# Patient Record
Sex: FEMALE | Race: WHITE | Employment: FULL TIME | URBAN - METROPOLITAN AREA
[De-identification: names, ages, dates, MRNs, and addresses within clinical notes are randomized per-mention and may not be internally consistent; named-entity substitution may affect disease eponyms.]

---

## 2017-03-08 ENCOUNTER — ALLIED HEALTH/NURSE VISIT (OUTPATIENT)
Dept: FAMILY MEDICINE | Facility: CLINIC | Age: 27
End: 2017-03-08
Payer: COMMERCIAL

## 2017-03-08 DIAGNOSIS — H66.001 ACUTE SUPPURATIVE OTITIS MEDIA OF RIGHT EAR WITHOUT SPONTANEOUS RUPTURE OF TYMPANIC MEMBRANE, RECURRENCE NOT SPECIFIED: Primary | ICD-10-CM

## 2017-03-08 DIAGNOSIS — H61.21 IMPACTED CERUMEN OF RIGHT EAR: ICD-10-CM

## 2017-03-08 PROCEDURE — 69209 REMOVE IMPACTED EAR WAX UNI: CPT

## 2017-03-08 PROCEDURE — 99207 ZZC NO CHARGE NURSE ONLY: CPT

## 2017-03-08 RX ORDER — AMOXICILLIN 875 MG
875 TABLET ORAL 2 TIMES DAILY
Qty: 20 TABLET | Refills: 0 | Status: SHIPPED | OUTPATIENT
Start: 2017-03-08 | End: 2018-10-17

## 2017-03-08 NOTE — MR AVS SNAPSHOT
After Visit Summary   3/8/2017    Mai Montgomery    MRN: [id number]           Patient Information     Date Of Birth          1990        Visit Information        Provider Department      3/8/2017 3:00 PM RG FLOAT 1 [facility] [place]        Today's Diagnoses     Acute suppurative otitis media of right ear without spontaneous rupture of tympanic membrane, recurrence not specified    -  1    Impacted cerumen of right ear           Follow-ups after your visit        Who to contact     If you have questions or need follow up information about today's clinic visit or your schedule please contact [facility] directly at [phone].  Normal or non-critical lab and imaging results will be communicated to you by [employer], letter or phone within 4 business days after the clinic has received the results. If you do not hear from us within 7 days, please contact the clinic through [employer] or phone. If you have a critical or abnormal lab result, we will notify you by phone as soon as possible.  Submit refill requests through [employer] or call your pharmacy and they will forward the refill request to us. Please allow 3 business days for your refill to be completed.          Additional Information About Your Visit        MyChart Information     [employer] gives you secure access to your electronic health record. If you see a primary care provider, you can also send messages to your care team and make appointments. If you have questions, please call your primary care clinic.  If you do not have a primary care provider, please call [phone] and they will assist you.        Care EveryWhere ID     This is your Care EveryWhere ID. This could be used by other organizations to access your [place] medical records  [id number]         Blood Pressure from Last 3 Encounters:   10/16/15 114/76   05/20/14 110/62   04/11/14 112/64    Weight from Last 3 Encounters:   10/16/15 138 lb 12.8 oz (63 kg)   05/20/14 130  lb 4.8 oz (59.1 kg)   04/11/14 135 lb 8 oz (61.5 kg)              We Performed the Following     REMOVE IMPACTED CERUMEN          Today's Medication Changes          These changes are accurate as of: 3/8/17  3:31 PM.  If you have any questions, ask your nurse or doctor.               Start taking these medicines.        Dose/Directions    amoxicillin 875 MG tablet   Commonly known as:  AMOXIL   Used for:  Acute suppurative otitis media of right ear without spontaneous rupture of tympanic membrane, recurrence not specified        Dose:  875 mg   Take 1 tablet (875 mg) by mouth 2 times daily   Quantity:  20 tablet   Refills:  0            Where to get your medicines      These medications were sent to [facility]/pharmacy #[id number] - [place] - [address] [address] [facility]  [address] [address] [address]     Phone:  [phone]     amoxicillin 875 MG tablet                Primary Care Provider Office Phone # Fax #    Rafaela Nolasco -[phone]       [facility] [address] [place] [address]        Thank you!     Thank you for choosing [facility]  for your care. Our goal is always to provide you with excellent care. Hearing back from our patients is one way we can continue to improve our services. Please take a few minutes to complete the written survey that you may receive in the mail after your visit with us. Thank you!             Your Updated Medication List - Protect others around you: Learn how to safely use, store and throw away your medicines at www.disposemymeds.org.          This list is accurate as of: 3/8/17  3:31 PM.  Always use your most recent med list.                   Brand Name Dispense Instructions for use    amoxicillin 875 MG tablet    AMOXIL    20 tablet    Take 1 tablet (875 mg) by mouth 2 times daily       balsalazide 750 MG capsule    COLAZAL     Take 3 capsules (2,250 mg) by mouth 3 times daily       * busPIRone 5 MG tablet     BUSPAR    120 tablet    Take 2 tablets (10 mg) by mouth 2 times daily       * busPIRone 10 MG tablet    BUSPAR    180 tablet    Take 1 tablet (10 mg) by mouth 2 times daily       mesalamine 4 G enema    ROWASA     1-2 times daily until symptoms resolve.       norelgestromin-ethinyl estradiol 150-35 MCG/24HR patch    ORTHO EVRA    12 patch    Place 1 patch onto the skin once a week       * Notice:  This list has 2 medication(s) that are the same as other medications prescribed for you. Read the directions carefully, and ask your doctor or other care provider to review them with you.

## 2017-03-08 NOTE — NURSING NOTE
Ear wash completed on right ear without difficulty. Cerumen removed. Had provider examine ear post ear wash due to redness and fluid noted in ear.     Ira Dos Santos, RN, BSN

## 2017-07-29 ENCOUNTER — HEALTH MAINTENANCE LETTER (OUTPATIENT)
Age: 27
End: 2017-07-29

## 2018-03-05 ENCOUNTER — TRANSFERRED RECORDS (OUTPATIENT)
Dept: HEALTH INFORMATION MANAGEMENT | Facility: CLINIC | Age: 28
End: 2018-03-05

## 2018-10-17 ENCOUNTER — OFFICE VISIT (OUTPATIENT)
Dept: FAMILY MEDICINE | Facility: CLINIC | Age: 28
End: 2018-10-17
Payer: COMMERCIAL

## 2018-10-17 VITALS
BODY MASS INDEX: 21.03 KG/M2 | SYSTOLIC BLOOD PRESSURE: 120 MMHG | TEMPERATURE: 98.7 F | HEIGHT: 67 IN | DIASTOLIC BLOOD PRESSURE: 84 MMHG | WEIGHT: 134 LBS | HEART RATE: 105 BPM | OXYGEN SATURATION: 98 %

## 2018-10-17 DIAGNOSIS — R07.0 THROAT PAIN: Primary | ICD-10-CM

## 2018-10-17 LAB
DEPRECATED S PYO AG THROAT QL EIA: NORMAL
HETEROPH AB SER QL: NEGATIVE
SPECIMEN SOURCE: NORMAL

## 2018-10-17 PROCEDURE — 87880 STREP A ASSAY W/OPTIC: CPT | Performed by: NURSE PRACTITIONER

## 2018-10-17 PROCEDURE — 87081 CULTURE SCREEN ONLY: CPT | Performed by: NURSE PRACTITIONER

## 2018-10-17 PROCEDURE — 86308 HETEROPHILE ANTIBODY SCREEN: CPT | Performed by: NURSE PRACTITIONER

## 2018-10-17 PROCEDURE — 36415 COLL VENOUS BLD VENIPUNCTURE: CPT | Performed by: NURSE PRACTITIONER

## 2018-10-17 PROCEDURE — 99213 OFFICE O/P EST LOW 20 MIN: CPT | Performed by: NURSE PRACTITIONER

## 2018-10-17 NOTE — PROGRESS NOTES
"HPI      SUBJECTIVE:   Mai Montgomery is a 28 year old female who presents to clinic today for the following health issues:      Chief Complaint   Patient presents with     Follow Up For     Acute pharyngitis, unspecified etiology (Primary Dx)       Sunday scratchy throat and Monday could hardly swallow   Monday had neg strep without culture   Pain has not improved at all   No fevers   Has aches, chills, sweats. No measured temp   Minimal nasal congestion   Ears plugged   Neck is sore over glands   No cough   Very fatigued       Past Medical History:   Diagnosis Date     ALYSSA (generalised anxiety disorder)     seasonal     Ulcerative colitis (H) age 19     Family History   Problem Relation Age of Onset     Hypertension Mother      Depression Mother      Cancer Paternal Grandfather      Depression Father      Family History Negative No family hx of      Asthma No family hx of      Diabetes No family hx of      Breast Cancer No family hx of      Cancer - colorectal No family hx of      Prostate Cancer No family hx of      Allergies No family hx of      Alzheimer Disease No family hx of      Arthritis No family hx of      Alcohol/Drug No family hx of      Anesthesia Reaction No family hx of      Blood Disease No family hx of      Congenital Anomalies No family hx of      Coronary Artery Disease Mother 45     cva      Past Surgical History:   Procedure Laterality Date     HIP SURGERY      right - osteochondroma     wisdom teeth  age 20     Social History   Substance Use Topics     Smoking status: Former Smoker     Types: Cigarettes     Smokeless tobacco: Never Used     Alcohol use Yes      Comment: rarely     No current outpatient prescriptions on file.     No Known Allergies    Reviewed and updated as needed this visit by clinical staff and provider       ROS  Detailed as above       /84 (BP Location: Right arm, Cuff Size: Adult Regular)  Pulse 105  Temp 98.7  F (37.1  C) (Oral)  Ht 5' 7.21\" (1.707 m)  Wt 134 lb " (60.8 kg)  [facility] 10/03/2018  SpO2 98%  BMI 20.86 kg/m2      Physical Exam   Constitutional: She is well-developed, well-nourished, and in no distress.   HENT:   Head: Normocephalic.   Right Ear: Tympanic membrane, external ear and ear canal normal.   Left Ear: Tympanic membrane, external ear and ear canal normal.   Nose: No mucosal edema.   Mouth/Throat: Posterior oropharyngeal erythema present. No oropharyngeal exudate or posterior oropharyngeal edema.   Eyes: Conjunctivae are normal.   Neck: Normal range of motion.   Cardiovascular: Normal rate, regular rhythm and normal heart sounds.    No murmur heard.  Pulmonary/Chest: Effort normal and breath sounds normal. No respiratory distress.   Musculoskeletal: Normal range of motion.   Lymphadenopathy:     She has no cervical adenopathy (tender without adenopathy).   Neurological: She is alert.   Skin: Skin is warm and dry.   Psychiatric: Mood and affect normal.   Vitals reviewed.      Assessment and Plan:       ICD-10-CM    1. Throat pain R07.0 Strep, Rapid Screen     Mononucleosis screen     Beta strep group A culture       Neg RST  Will screen for mono   If mono neg, then suspect viral etiology  Discussed tyl/ibu prn. Warm salt water gargles for pain   Call or rtc with worsening symptoms or no improvement       DENA Parker, CNP  [facility]

## 2018-10-17 NOTE — MR AVS SNAPSHOT
"              After Visit Summary   10/17/2018    Mai Montgomery    MRN: [id number]           Patient Information     Date Of Birth          1990        Visit Information        Provider Department      10/17/2018 11:00 AM Corinna Boone APRN CNP [facility]        Today's Diagnoses     Throat pain    -  1       Follow-ups after your visit        Who to contact     If you have questions or need follow up information about today's clinic visit or your schedule please contact [facility] directly at [phone].  Normal or non-critical lab and imaging results will be communicated to you by [employer], letter or phone within 4 business days after the clinic has received the results. If you do not hear from us within 7 days, please contact the clinic through [employer] or phone. If you have a critical or abnormal lab result, we will notify you by phone as soon as possible.  Submit refill requests through [employer] or call your pharmacy and they will forward the refill request to us. Please allow 3 business days for your refill to be completed.          Additional Information About Your Visit        MyChart Information     [employer] lets you send messages to your doctor, view your test results, renew your prescriptions, schedule appointments and more. To sign up, go to [email or web address] . Click on \"Log in\" on the left side of the screen, which will take you to the Welcome page. Then click on \"Sign up Now\" on the right side of the page.     You will be asked to enter the access code listed below, as well as some personal information. Please follow the directions to create your username and password.     Your access code is: [email or web address]  Expires: 1/15/2019  1:46 PM     Your access code will  in 90 days. If you need help or a new code, please call your [facility] or [phone].        Care EveryWhere ID     This is your Care EveryWhere ID. This could be used by other organizations to " "access your [place] medical records  [id number]        Your Vitals Were     Pulse Temperature Height Last Period Pulse Oximetry BMI (Body Mass Index)    105 98.7  F (37.1  C) (Oral) 5' 7.21\" (1.707 m) 10/03/2018 98% 20.86 kg/m2       Blood Pressure from Last 3 Encounters:   10/17/18 120/84   10/16/15 114/76   05/20/14 110/62    Weight from Last 3 Encounters:   10/17/18 134 lb (60.8 kg)   10/16/15 138 lb 12.8 oz (63 kg)   05/20/14 130 lb 4.8 oz (59.1 kg)              We Performed the Following     Beta strep group A culture     Mononucleosis screen     Strep, Rapid Screen        Primary Care Provider Office Phone # Fax #    Rafaela Nolasco -[phone] [address] [facility] [address]        Equal Access to Services     [facility] : Hadii aad ku hadasho Soomaali, waaxda luqadaha, qaybta kaalmada adeegyada, waxay renaein hayeleuterion vishal [facility] . So [facility] [phone].    ATENCIÓN: Si habla karla, tiene a fish disposición servicios gratuitos de asistencia lingüística. Llame al [phone].    We comply with applicable federal civil rights laws and [place] laws. We do not discriminate on the basis of race, color, national origin, age, disability, sex, sexual orientation, or gender identity.            Thank you!     Thank you for choosing [facility]  for your care. Our goal is always to provide you with excellent care. Hearing back from our patients is one way we can continue to improve our services. Please take a few minutes to complete the written survey that you may receive in the mail after your visit with us. Thank you!             Your Updated Medication List - Protect others around you: Learn how to safely use, store and throw away your medicines at www.disposemymeds.org.      Notice  As of 10/17/2018  1:46 PM    You have not been prescribed any medications.      "

## 2018-10-18 LAB
BACTERIA SPEC CULT: NORMAL
SPECIMEN SOURCE: NORMAL

## 2019-05-06 ENCOUNTER — TELEPHONE (OUTPATIENT)
Dept: FAMILY MEDICINE | Facility: CLINIC | Age: 29
End: 2019-05-06

## 2019-05-06 NOTE — TELEPHONE ENCOUNTER
Summary:    Patient is due/failing the following:   PAP and PHYSICAL    Action needed:   Patient needs office visit for Physical and PAP.    Type of outreach:    Phone, left message for patient to call back.     Questions for provider review:    None                                                                                                                                    Shanika Meng       Chart routed to Care Team .      Panel Management Review      Patient has the following on her problem list: None      Composite cancer screening  Chart review shows that this patient is due/due soon for the following Pap Smear

## 2019-05-06 NOTE — LETTER
[facility]  [address], [address]  [place] [address]  Phone: [phone]  Fax: [phone]  May 29, 2019      Mai Montgomery  [address] [address] [address]      Dear Mai,    We care about your health and have reviewed your health plan including your medical conditions, medications, and lab results.  Based on this review, it is recommended that you follow up regarding the following health topic(s):  -Cervical Cancer Screening  -Wellness (Physical) Visit     We recommend you take the following action(s):  -schedule a PAP SMEAR EXAM which is due.  Please disregard this reminder if you have had this exam elsewhere within the last year.  It would be helpful for us to have a copy of your recent pap smear report to update your records.     Please call us at the [facility] - [phone] (or use [employer]) to address the above recommendations.     Thank you for trusting [facility] and we appreciate the opportunity to serve you.  We look forward to supporting your healthcare needs in the future.    Healthy Regards,    Your Health Care Team  [facility]

## 2020-03-25 ENCOUNTER — NURSE TRIAGE (OUTPATIENT)
Dept: NURSING | Facility: CLINIC | Age: 30
End: 2020-03-25

## 2020-03-25 ENCOUNTER — VIRTUAL VISIT (OUTPATIENT)
Dept: FAMILY MEDICINE | Facility: OTHER | Age: 30
End: 2020-03-25

## 2020-03-25 NOTE — TELEPHONE ENCOUNTER
"Patient calling- reports she has history of Ulcerative colitis- is having issues with a \"flare up\". Declines triage. Wants to to set up an appointment tomorrow with provider. Advised that clinic are doing telephone visits. Warm transferred to scheduling.     Soha Flores, RN/[facility]    Reason for Disposition    Requesting regular office appointment    Additional Information    Negative: [1] Caller is not with the adult (patient) AND [2] reporting urgent symptoms    Negative: Lab result questions    Negative: Medication questions    Negative: Caller can't be reached by phone    Negative: Caller has already spoken to PCP or another triager    Negative: RN needs further essential information from caller in order to complete triage    Protocols used: INFORMATION ONLY CALL-A-AH      "

## 2020-03-26 NOTE — PROGRESS NOTES
"Date: 2020 18:28:28  Clinician: Juan Alberto Sims  Clinician NPI: [id number]  Patient: Mai Chacon  Patient : 1990  Patient Address: [address] [place] [address]  Patient Phone: [phone]  Visit Protocol: IBS  Patient Summary:  Mai is a 30 year old ( : 1990 ) female who initiated a Visit for evaluation of IBS. When asked the question \"Please sign me up to receive news, health information and promotions from [employer].\", Mai responded \"No\".    In the last 3 months, she notes experiencing abdominal pain or discomfort more than one day a week. She has been experiencing discomfort or pain for less than 6 months. With regard to the abdominal pain, the patient notes:     The pain sometimes got better or stopped completely after a bowl movement    Always having more frequent bowel movements after the pain started    Never having less frequent bowel movements after the pain started    Always having looser or less solid stools when the pain started    Never having harder stools when the pain started     In the last 3 months, she experienced the following:     Hard or lumpy stools: never     Loose, mushy or watery stools: about 50% of the time     Blood in the stool: never     Black stools: never     Vomited blood: never     Sharp abdominal pain     The patient denies the following: abdominal pain that interferes with sleep, diarrhea that interferes with sleep, pain with urination, feeling feverish, recent abdominal trauma or injury, increased urination frequency, reflux, heartburn, unintentional weight loss, and back pain associated with stomach symptoms.   The patient has NOT modified her diet to treat these symptoms. She has also attempted the following treatments:    OTC anti-diarrhea medication (improved symptoms)    She has been previously diagnosed with Ulcerative colitis.   The patient does not smoke or use smokeless tobacco.   She denies pregnancy and denies breastfeeding. She " has menstruated in the past month.     MEDICATIONS: No current medications, ALLERGIES: NKDA  Clinician Response:  Dear Mai,  Based on the information you provided, you likely have Irritable Bowel Syndrome, a condition that leads to abdominal pain and cramping.  Unless you are allergic to the over-the-counter medication(s) below, I recommend using:       Methylcellulose (Citrucel). Take 1 scoop orally 1-3 times a day mixed with water. This is an over-the-counter medication that does not require a prescription.      Loperamide (Imodium). Take 4 mg by mouth once, then 2 mg by mouth after each loose stool until symptoms are controlled. Maximum 4 tablets per day. This is an over-the-counter medication that does not require a prescription.     Irritable Bowel Syndrome (IBS) is a common condition described as a combination of persistent and recurrent abdominal pain that is associated with abnormal bowel habits including diarrhea, constipation or both. Many individuals experience pain after eating and the pain is typically relieved after a bowel movement. Other symptoms of irritable bowel syndrome include bloating, gas, and burping or belching. The exact cause of IBS is not known.  The goal of treatment is to reduce your symptoms to a manageable level. Treatment of IBS is typically a long-term process and you may need to try several different treatments or may need to combine treatments to reduce your symptoms.     Monitor your symptoms to try determine if certain things make your symptoms worse, such as consuming dairy products.    Dietary management may include eliminating certain foods from your diet. However, consult with a health care professional before completely eliminating foods from your diet.    Increasing dietary fiber or using fiber supplements can reduce IBS symptoms.    Medications: prescription medications are sometimes recommended. However, these medications are not typically used until a person has tried  dietary changes or fiber supplements.    Over the counter medications for treatment of diarrhea should not be continued on an ongoing basis unless you have consulted with a health care professional.     For more information about IBS and treatment, see this [employer] online resource: http://www.nlm.nih.gov/medlineplus/irritablebowelsyndrome.html.   If at any time your symptoms worsen while trying these therapies, follow up with your primary care provider.   If you do not experience any improvement within two to four weeks, you should be seen by your primary care provider.   If you develop worsening abdominal pain with nausea, vomiting, or blood in stool, be seen by your primary care provider, urgent care clinic, or emergency department.   Diagnosis: Irritable bowel syndrome with diarrhea  Diagnosis ICD: K58.0

## 2020-03-27 ENCOUNTER — VIRTUAL VISIT (OUTPATIENT)
Dept: FAMILY MEDICINE | Facility: CLINIC | Age: 30
End: 2020-03-27
Payer: COMMERCIAL

## 2020-03-27 DIAGNOSIS — K51.30 ULCERATIVE RECTOSIGMOIDITIS WITHOUT COMPLICATION (H): Primary | ICD-10-CM

## 2020-03-27 DIAGNOSIS — Z30.02 ENCOUNTER FOR COUNSELING AND INSTRUCTION IN NATURAL FAMILY PLANNING TO AVOID PREGNANCY: ICD-10-CM

## 2020-03-27 PROCEDURE — 99214 OFFICE O/P EST MOD 30 MIN: CPT | Mod: TEL | Performed by: INTERNAL MEDICINE

## 2020-03-27 RX ORDER — MESALAMINE 500 MG/1
1000 CAPSULE, EXTENDED RELEASE ORAL 4 TIMES DAILY
Qty: 240 CAPSULE | Refills: 3 | Status: SHIPPED | OUTPATIENT
Start: 2020-03-27 | End: 2023-04-19

## 2020-03-27 RX ORDER — PREDNISONE 10 MG/1
TABLET ORAL
Qty: 70 TABLET | Refills: 0 | Status: SHIPPED | OUTPATIENT
Start: 2020-03-27 | End: 2023-04-19

## 2020-03-27 NOTE — PATIENT INSTRUCTIONS
Patient Education     Birth Control Choices  Birth control keeps you from getting pregnant during sex. There are many types of birth control. Some are more effective than others. New types are being tested all the time. Your healthcare provider can help you decide which type of birth control is best for you. But no matter which type you choose, you and your partner must use it the right way each time you have sex. Some of the most common types are described below.  Condom  A condom is a thin covering that fits over the penis. (The female condom fits inside the vagina.) A condom catches sperm that come out of the penis during sex.  Spermicide  Spermicide is a gel, foam, cream, tablet, or sponge (although the sponge has barrier properties in addition to spermicidal properties). It is put in the vagina before sex to kill sperm.  Diaphragm and cervical cap  Diaphragms and cervical caps are round rubber cups that keep sperm out of the uterus. They also hold spermicide in place.  Intrauterine device (IUD)  An IUD is a small device that is placed in the uterus by a healthcare provider to prevent pregnancy.  The pill  The birth control pill is taken daily. It contains hormones that stop a woman s body from releasing an egg each month.  Other hormones  Hormones that stop a woman s egg from being released each month can be delivered in other ways. These include injection, implant, patch, or vaginal ring.  Other choices  Here are additional birth control methods:    Male sterilization (vasectomy) is surgery that ties off or cuts the tubes called the vas deferens in the testes. This is done so sperm cannot come out when the man ejaculates.    Female sterilization is surgery to block or cut the woman's fallopian tubes. It can be done by placing an instrument into the uterus (hysteroscopy) to insert small coils into the fallopian tubes (Essure). It can also be done through the belly (laparoscopy) to block the tubes or  remove part or all of the tubes.    Withdrawal method is when the male doesn't ejaculate into the vagina, but rather withdraws his penis just before he ejaculates.    Fertility awareness method is when a woman keeps track of her fertile days. She only has intercourse at times when she is not likely to get pregnant.  Emergency contraception (EC)  Emergency contraception can help prevent pregnancy after unprotected sex. Hormone pills ( morning after pills ) are available over the counter to anyone. A second type of EC, a copper IUD, needs to be inserted by a trained healthcare provider. Either type of EC can be used up to 5 days after sex, but it should be taken as soon as possible. The sooner it is used after unprotected sex, the more likely it is to be effective. EC will not work if you re already pregnant.  Things to consider  Think about the following:    Choose a type of birth control that is easy for you to use.    Read the package and follow your health care provider's instructions to learn to use your birth control the right way.    Most forms of birth control do not protect you from sexually transmitted infections (STIs). To protect against STIs, always use a latex condom. If you are allergic to latex, a nonlatex condom may provide some protection.   Date Last Reviewed: 12/1/2016 2000-2019 [employer]. [address] [place] [address], [place] [address]. All rights reserved. This information is not intended as a substitute for professional medical care. Always follow your healthcare professional's instructions.           Patient Education     Birth Control: Natural Family Planning     To use NFP, you keep daily records of the signs that show when you are fertile.     Natural Family Planning (NFP) is based on a woman's awareness of when she is fertile (likely to become pregnant). By learning how to tell when you're fertile, you can know when to avoid sex. This can help prevent pregnancy. To learn NFP,  it is advised that you take a class or work with a qualified teacher.  Pregnancy rates  Talk to your healthcare provider about the effectiveness of this birth control method.  Using NFP  A woman is fertile only during a certain part of her monthly cycle -- just before and during ovulation. By learning when you ovulate, you can know when you're likely to be fertile. You estimate when you're ovulating by observing and keeping track of certain physical signs. You can then avoid sex or use a barrier method during that fertile time. But be aware that each woman's cycle and signs are different, and no woman's cycle is perfectly regular.  Pros    Both partners share responsibility    No known health risks    No side effects    Is inexpensive or free    If you abstain from sex during fertile periods, this method is approved by all [employer] communities    Easy to stop if you decide you want to become pregnant  Cons    Takes time to learn    Requires daily observation and charting    Requires abstaining from sex or using a barrier method during fertile periods (nine or more days per month)    Does not protect against sexually transmitted infections (STIs)  When natural family planning may not be for you  Natural family planning may not be for you if:    You don't have the full cooperation of your partner    You haven't received training from a qualified teacher    Your periods are not regular    You take certain medicines or should not get pregnant due to a medical condition    You just started having periods or you are approaching menopause  Date Last Reviewed: 3/1/2017    1099-5899 [employer]. [address] [place] [address]. All rights reserved. This information is not intended as a substitute for professional medical care. Always follow your healthcare professional's instructions.           Patient Education     Managing Ulcerative Colitis: Medicines  Your healthcare provider may prescribe  medicine to help control your ulcerative colitis. Medicine can help lessen symptoms. It won t cure ulcerative colitis, but it can help improve your quality of life. Work closely with your healthcare provider. You may have certain side effects or your symptoms may change. In this case, your medicine or dosage may need to be changed.  Types of medicines  You may be prescribed any of these types of medicines:    Anti-inflammatories    Corticosteroids    Immunomodulators    Biologic agents    Antibiotics    Probiotics  You can learn more about each kind below.  Anti-inflammatories  These medicines can reduce inflammation and pain in the intestinal lining. The most common anti-inflammatories for ulcerative colitis are 5-ASA compounds, such as sulfasalazine and mesalamine. These help control symptoms over long periods of time. They may be taken as pills, but they also can be taken as an enema or suppository inserted directly into the rectum.  Corticosteroids  Your healthcare provider may advise you to take corticosteroids. These help to calm inflammation in your body. This can make your symptoms better quickly. You may take corticosteroids as a pill or liquid by mouth. In some cases, they may be given through an IV. Or they may be given rectally as either a suppository or an enema. You take them for a short time, usually not longer than 8 to 12 weeks. You do not take them when you are in remission. Remission is a long period with no symptoms.  If used for a long time, side effects may include:    Mood changes    Trouble sleeping    Changes in body shape    Puffy face or acne    Weight gain    Stretch marks    Eye problems    Bone loss or breaks    Facial hair in women    High blood pressure    Risk of diabetes  Immunomodulators  These medicines cause your body's immune system to be less active. This can help reduce inflammation and calm your symptoms. They are taken as a pill by mouth. You may not feel their effects until  you have taken them for a few months. But you can take them for a long time. You will need to have blood tests every few months to check your liver and blood cell counts.   Side effects may include:    Nausea    Body aches    Inflammation of the pancreas    Low white blood cell count    Liver problems    Low folic acid levels    Infection    Lymphoma    Non-melanoma skin cancer  Biologic agents  These kinds of medicines help stop body chemicals that cause inflammation. One medicine, infliximab, is an antibody that blocks a chemical called tumor necrosis factor (TNF), which plays a significant role in intestinal inflammation.  Another medicine, vedolizumab, is a different antibody that blocks white blood cells from getting into the intestinal tissue and causing inflammation. New biologics are also being created that target different ways the intestine gets inflamed in ulcerative colitis. These medicines may be given different ways. They may be given by vein (IV) every 2 to 8 weeks. They may be given with a shot (injection) once a week or once a month. These medicines can put you at risk for infections. Tell your healthcare provider if you have a chronic infection. You will need to be tested for tuberculosis and hepatitis B infection before taking the medicine.  Side effects may include:    Flushing, check pain, shortness of breath, hives, or a drop in blood pressure during IV treatment    Joint and muscle aches    Rash    Fever    Infection    Lymphoma    Skin cancers    Liver toxicity    TNF-induced psoriasis  Antibiotics  These may be used if you also have an infection, such as an abscess. Antibiotics may be given as a pill taken by mouth, or sometimes into a vein (IV) for more serious infections. Depending on the antibiotic, you may need to stay out of the sun, or completely avoid alcohol. Although antibiotics may be of great benefit, they also may cause severe reactions. These can include nausea, vomiting, and  breathing problems. Tell your healthcare provider right away if you have numbness or tingling in your hands. Also tell your healthcare provider if your bowel symptoms become worse.  Side effects may include:    Nausea    Diarrhea    Headaches    Dizziness    Dark urine    Loss of appetite    Metallic taste in the mouth    Sensitivity to the sun  Probiotics  Probiotics are living organisms beneficial for health that you can take in a supplement. They may help some cases of ulcerative colitis, but research studies are not clear about if there is a benefit. Talk with your healthcare provider to learn more.  Managing side effects  Your healthcare provider will explain the side effects of any new medicines. In most cases, side effects are easy to manage. But sometimes they can be so severe that you need to change medicine. Call your healthcare provider if you have any of the below:    Side effects that are hard to manage    Severe side effects    Unexpected side effects  Date Last Reviewed: 11/1/2016 2000-2019 [employer]. [address] [place] [address]. All rights reserved. This information is not intended as a substitute for professional medical care. Always follow your healthcare professional's instructions.           Patient Education     Management of Ulcerative Colitis: Lifestyle    You can lead a full life even if you have ulcerative colitis. Focus on keeping your symptoms under control. And don t let this disease isolate you. By planning ahead and working with support groups, you can find ways to cope. And you may even help others who have ulcerative colitis.  Ulcerative colitis is a type of inflammatory bowel disease (IBD).   Have a plan  Make this your goal:  Ulcerative colitis won t keep me from the activities I enjoy.  You may need to do some planning to reach that goal. But by staying positive, you can help make sure you re in control--not ulcerative colitis. Here are some other  tips:    Know where to find clean bathrooms.    Eat more small meals instead of 3 big meals, especially when on the road or when you don t have easy access to bathrooms.    If you ve had a recent flare-up, eat foods that you know will limit your symptoms. Keep those foods on hand, both at home and at work.    Get some exercise every day.    Take a stress reduction class.    If going on a long trip, discuss your plans with your healthcare provider. He or she can teach you what to do if you have a flare-up while on the road.  Find a support group  Ulcerative colitis support groups can help you with many concerns you may have. Other people have felt much of what you may be feeling. Just knowing that you re not alone can be a great comfort. Or someone in a support group may offer a travel tip or a coping skill that s perfect for you. And don t forget how satisfying it can feel to help another ulcerative colitis patient who s in need. Contact the [facility] toll-free at [phone].  Managing nutrition  You may be able to eat most foods until you have a flare-up. But like anyone else, you need to make healthy eating choices. Some of the healthiest foods can make symptoms worse, though. Keeping track of your  problem foods  may be helpful. Ask your healthcare provider any questions you have about healthy eating.  Avoid your problem foods  There s no rule for which foods can be a problem. How you feel after eating them is the best guide. You may need to avoid high-fiber foods and foods that are hard to digest. These can include fresh fruits and vegetables. High-fat foods, such as whole-milk dairy products and red meat, can also worsen symptoms in a flare-up. Write down what you eat and how it affects you. If one kind of food often gives you trouble, stay away from it. Also note the foods that work well for you. Your healthcare provider may have you see a nutritionist to come up with the best food  choices for you. A nutritionist can help ensure that you eat foods that are  safe  while getting proper nourishment.  Foods that are often  safe   No two people respond the same to all foods. But these choices are often  safe  to eat during a flare-up:    Applesauce    July toast    Flavored gelatin    Vanilla pudding    Custard    White rice    Plain pasta   Canned peaches or pears    Baked potatoes     Tuna packed in water    Mashed potatoes    Skinless chicken    Instant oatmeal   Date Last Reviewed: 10/1/2016    3811-1317 [employer]. [address] [place] [address]. All rights reserved. This information is not intended as a substitute for professional medical care. Always follow your healthcare professional's instructions.

## 2020-03-27 NOTE — PROGRESS NOTES
"Mai Montgomery is a 30 year old female who is being evaluated via a telephone visit.      The patient has been notified of following (by BERNARDO BESS MA      \"We have found that certain health care needs can be provided without the need for a physical exam.  This service lets us provide the care you need with a short phone conversation.  If a prescription is necessary we can send it directly to your pharmacy.  If lab work is needed we can place an order for that and you can then stop by our lab to have the test done at a later time.    This telephone visit will be conducted via 3 way call with the you (the patient) , the physician/provider, and a me all on the line at the same time.  This allows your physician/provider to have the phone conversation with you while I will be taking notes for your medical record.  We will have full access to your [facility] medical record during this entire phone call.    Since this is like an office visit,  will bill your insurance company for this service.  Please check with your medical insurance if this type of telephone/virtual is covered . You may be responsible for the cost of this service if insurance coverage is denied.  The typical cost is $30 (10min), $59(11-20min) and $85 (21-30min)     If during the course of the call the physician/provider feels a telephone visit is not appropriate, you will not be charged for this service\"    Consent has been obtained for this service by care team member: yes.  See the scanned image in the medical record.    S:   Mai Montgomery is a 30 year old female who is being evaluated via a billable telephone visit.      The patient has been notified of following:     \"This telephone visit will be conducted via a call between you and your physician/provider. We have found that certain health care needs can be provided without the need for a physical exam.  This service lets us provide the care you need with a short phone conversation.  If a prescription is " "necessary we can send it directly to your pharmacy.  If lab work is needed we can place an order for that and you can then stop by our lab to have the test done at a later time.    If during the course of the call the physician/provider feels a telephone visit is not appropriate, you will not be charged for this service.\"     Mai Montgomery complains of   Chief Complaint   Patient presents with     Abdominal Pain     Had virtual visit 3-25-20 thru [facility]      Patient is new to me.  Her mother has asked me to see her virtually.  About 10 years ago patient had blood in the stool and mucus, and she was found to have ulcerative colitis by colonoscopy evaluation  She was given steroids and Pentasa but she has stopped taking those medications  Steroids worked very well for her for her flareup  Now for 1 month, patient has mucus and diarrhea up to 10 times per day with abdominal discomfort  She also has nighttime diarrhea  No weight loss or fever    She is  since last year  She is not using contraception  She does not see a gastroenterologist at present  I have reviewed and updated the patient's Past Medical History, Social History, Family History and Medication List.    ALLERGIES  Patient has no known allergies.    Past medical history  Past Medical History:   Diagnosis Date     ALYSSA (generalised anxiety disorder)     seasonal     Ulcerative colitis (H) age 19     Past Surgical History:   Procedure Laterality Date     HIP SURGERY  2002    right - osteochondroma     wisdom teeth  age 20     Family History   Problem Relation Age of Onset     Hypertension Mother      Depression Mother      Coronary Artery Disease Mother 45        cva      Atrial fibrillation Mother      Cancer Paternal Grandfather      Depression Father      GI problems Brother      Family History Negative No family hx of      Asthma No family hx of      Diabetes No family hx of      Breast Cancer No family hx of      Cancer - colorectal No family hx of  "     Prostate Cancer No family hx of      Allergies No family hx of      Alzheimer Disease No family hx of      Arthritis No family hx of      Alcohol/Drug No family hx of      Anesthesia Reaction No family hx of      Blood Disease No family hx of      Congenital Anomalies No family hx of          Assessment/Plan:  1. Ulcerative rectosigmoiditis without complication (H)  I discussed with patient why Pentasa is needed for continuation of the remission and cancer prevention  I discussed the GI side effects  She prefers to take prednisone alone and I do not think that is a good idea in addition I think she should see a gastroenterologist    She wishes to find an internist with gastroenterology interest, but I think it will be preferable to see gastroenterologist with inflammatory bowel disease training  And interest    I discussed with her about nicotine patches  - GASTROENTEROLOGY ADULT REF CONSULT ONLY  - GASTROENTEROLOGY ADULT REF CONSULT ONLY  - mesalamine ER (PENTASA) 500 MG CR capsule; Take 2 capsules (1,000 mg) by mouth 4 times daily  Dispense: 240 capsule; Refill: 3  - predniSONE (DELTASONE) 10 MG tablet; 40 MG DAILY FOR A WEEK, THEN 30 MG DAILY FOR 1 WEEK, THEN 20 MG DAILY FOR 1 WEEK, THEN 10 MG DAILY FOR 1 WEEK, THEN 5 MG DAILY  Dispense: 70 tablet; Refill: 0    2. Encounter for counseling and instruction in natural family planning to avoid pregnancy  These medications can affect the pregnancy and therefore I discussed with her about contraceptive methods  She is interested in only natural methods and I discussed those with her  However she could use a condom and she is agreeable to that    aCrmela Kaufman      Phone call duration:  26 minutes    CARMELA KAUFMAN M.D., F.A.C.P

## 2021-08-17 ENCOUNTER — TRANSFERRED RECORDS (OUTPATIENT)
Dept: HEALTH INFORMATION MANAGEMENT | Facility: CLINIC | Age: 31
End: 2021-08-17

## 2021-08-17 LAB
CREATININE (EXTERNAL): 0.58 MG/DL (ref 0.57–1)
GFR ESTIMATED (EXTERNAL): 122 ML/MIN/1.73
GFR ESTIMATED (IF AFRICAN AMERICAN) (EXTERNAL): 141 ML/MIN/1.73

## 2022-05-26 ENCOUNTER — TRANSFERRED RECORDS (OUTPATIENT)
Dept: HEALTH INFORMATION MANAGEMENT | Facility: CLINIC | Age: 32
End: 2022-05-26
Payer: COMMERCIAL

## 2022-06-08 ENCOUNTER — TRANSFERRED RECORDS (OUTPATIENT)
Dept: HEALTH INFORMATION MANAGEMENT | Facility: CLINIC | Age: 32
End: 2022-06-08
Payer: COMMERCIAL

## 2023-04-18 ENCOUNTER — TELEPHONE (OUTPATIENT)
Dept: FAMILY MEDICINE | Facility: CLINIC | Age: 33
End: 2023-04-18
Payer: COMMERCIAL

## 2023-04-18 NOTE — TELEPHONE ENCOUNTER
New Medication Request    Contacts       Type Contact Phone/Fax    04/18/2023 11:58 AM CDT Phone (Incoming) Mai Montgomery (Self) [phone] (H)          What medication are you requesting?: muscle relaxer    Reason for medication request: Back pain    Have you taken this medication before?: No    Controlled Substance Agreement on file:   CSA -- Patient Level:    CSA: None found at the patient level.         Patient offered an appointment? No    Preferred Pharmacy:   [facility]/pharmacy #[id number] - [facility] [address], [address], [facility] [address]  Phone: [phone] Fax: [phone]      Okay to leave a detailed message?: Yes at Cell number on file:    Telephone Information:   Mobile [phone]

## 2023-04-18 NOTE — TELEPHONE ENCOUNTER
I do not see a muscle relaxer on the med list and pt has not been a [facility] pt since 2014. Hasn't been seen by [facility] since 2020    Visit needed

## 2023-04-19 ENCOUNTER — OFFICE VISIT (OUTPATIENT)
Dept: FAMILY MEDICINE | Facility: CLINIC | Age: 33
End: 2023-04-19
Payer: COMMERCIAL

## 2023-04-19 VITALS
OXYGEN SATURATION: 97 % | DIASTOLIC BLOOD PRESSURE: 85 MMHG | HEART RATE: 109 BPM | SYSTOLIC BLOOD PRESSURE: 122 MMHG | WEIGHT: 148.9 LBS | RESPIRATION RATE: 17 BRPM | HEIGHT: 68 IN | TEMPERATURE: 98.2 F | BODY MASS INDEX: 22.57 KG/M2

## 2023-04-19 DIAGNOSIS — M46.1 SACROILIITIS (H): Primary | ICD-10-CM

## 2023-04-19 PROCEDURE — 99213 OFFICE O/P EST LOW 20 MIN: CPT | Performed by: FAMILY MEDICINE

## 2023-04-19 RX ORDER — ERGOCALCIFEROL 1.25 MG/1
50000 CAPSULE, LIQUID FILLED ORAL WEEKLY
COMMUNITY
Start: 2022-06-09

## 2023-04-19 RX ORDER — METHYLPREDNISOLONE 4 MG
TABLET, DOSE PACK ORAL
Qty: 21 TABLET | Refills: 0 | Status: SHIPPED | OUTPATIENT
Start: 2023-04-19

## 2023-04-19 RX ORDER — CYCLOBENZAPRINE HCL 10 MG
10 TABLET ORAL
Qty: 42 TABLET | Refills: 0 | Status: SHIPPED | OUTPATIENT
Start: 2023-04-19 | End: 2024-05-29

## 2023-04-19 RX ORDER — BALSALAZIDE DISODIUM 750 MG/1
CAPSULE ORAL
COMMUNITY
Start: 2023-04-15

## 2023-04-19 ASSESSMENT — PAIN SCALES - GENERAL: PAINLEVEL: EXTREME PAIN (8)

## 2023-04-19 ASSESSMENT — PATIENT HEALTH QUESTIONNAIRE - PHQ9
10. IF YOU CHECKED OFF ANY PROBLEMS, HOW DIFFICULT HAVE THESE PROBLEMS MADE IT FOR YOU TO DO YOUR WORK, TAKE CARE OF THINGS AT HOME, OR GET ALONG WITH OTHER PEOPLE: VERY DIFFICULT
SUM OF ALL RESPONSES TO PHQ QUESTIONS 1-9: 10
SUM OF ALL RESPONSES TO PHQ QUESTIONS 1-9: 10

## 2023-04-19 ASSESSMENT — ENCOUNTER SYMPTOMS: BACK PAIN: 1

## 2023-04-19 NOTE — PROGRESS NOTES
Assessment & Plan     Sacroiliitis (H)  Alternate between ice and heat  - cyclobenzaprine (FLEXERIL) 10 MG tablet; Take 1 tablet (10 mg) by mouth nightly as needed for muscle spasms  - methylPREDNISolone (MEDROL DOSEPAK) 4 MG tablet therapy pack; Follow Package Directions      Fortino Cuevas MD  [facility] JESSICA Oneill is a 33 year old, presenting for the following health issues:  Back Pain        4/19/2023     1:44 PM   Additional Questions   Roomed by SB   Accompanied by self         4/19/2023     1:45 PM   Patient Reported Additional Medications   Patient reports taking the following new medications Balsalazide disodium     Back Pain     History of Present Illness       Back Pain:  She presents for follow up of back pain. Patient's back pain is a chronic problem.  Location of back pain:  Right lower back, left lower back, right middle of back and left middle of back  Description of back pain: dull ache, sharp, shooting and stabbing  Back pain spreads: right buttocks and left buttocks    Since patient first noticed back pain, pain is: rapidly worsening  Does back pain interfere with her job:  Yes  She consumes 0 sweetened beverage(s) daily.She exercises with enough effort to increase her heart rate 10 to 19 minutes per day.  She exercises with enough effort to increase her heart rate 4 days per week. She is missing 1 dose(s) of medications per week.    Today's PHQ-9         PHQ-9 Total Score: 10    PHQ-9 Q9 Thoughts of better off dead/self-harm past 2 weeks :   Not at all    How difficult have these problems made it for you to do your work, take care of things at home, or get along with other people: Very difficult       Pain History:  When did you first notice your pain? 1 week   Have you seen anyone else for your pain? Yes - Chiropractor  How has your pain affected your ability to work? Can work but pain limits what they can do.   Where in your body do you have pain? Back  "Pain  Onset/Duration: 1 week  Description:   Location of pain: low back bilateral  Character of pain: sharp, dull ache and stabbing  Pain radiation: none  New numbness or weakness in legs, not attributed to pain: no   Intensity: Currently 8/10  Progression of Symptoms: improving  History:   Specific cause: none  Pain interferes with job: YES  History of back problems: yes, but with would go to Chiropractor for an adjustment and symptoms would go away   Any previous MRI or X-rays: None  Sees a specialist for back pain: No  Alleviating factors:   Improved by: chiropractor, heat and NSAIDs    Precipitating factors:  Worsened by: Lifting, Bending and Lying Flat  Therapies tried and outcome: chiropractor, heat and NSAIDs    Accompanying Signs & Symptoms:  Risk of Fracture: None  Risk of Cauda Equina: None  Risk of Infection: None  Risk of Cancer: None  Risk of Ankylosing Spondylitis: Onset at age <35, male, AND morning back stiffness  no           Review of Systems   Musculoskeletal: Positive for back pain.      {R  Objective    /85 (BP Location: Left arm, Patient Position: Sitting, Cuff Size: Adult Regular)   Pulse 109   Temp 98.2  F (36.8  C) (Temporal)   Resp 17   Ht 1.715 m (5' 7.52\")   Wt 67.5 kg (148 lb 14.4 oz)   LMP 03/29/2023 (Approximate)   SpO2 97%   Breastfeeding No   BMI 22.96 kg/m    Body mass index is 22.96 kg/m .  Physical Exam   GENERAL: healthy, alert and no distress  NECK: no adenopathy, no asymmetry, masses, or scars and thyroid normal to palpation  RESP: lungs clear to auscultation - no rales, rhonchi or wheezes  CV: regular rate and rhythm, normal S1 S2, no S3 or S4, no murmur, click or rub, no peripheral edema and peripheral pulses strong  ABDOMEN: soft, nontender, no hepatosplenomegaly, no masses and bowel sounds normal  NEURO: Normal strength and tone, mentation intact and speech normal  Comprehensive back pain exam:  Tenderness of tenderness SI joint, Pain limits the following " motions: flexion, Lower extremity strength functional and equal on both sides and Straight leg raise negative bilaterally  PSYCH: mentation appears normal, affect normal/bright

## 2024-05-29 DIAGNOSIS — M46.1 SACROILIITIS (H): ICD-10-CM

## 2024-05-29 RX ORDER — CYCLOBENZAPRINE HCL 10 MG
10 TABLET ORAL
Qty: 10 TABLET | Refills: 0 | Status: SHIPPED | OUTPATIENT
Start: 2024-05-29

## 2024-05-29 NOTE — TELEPHONE ENCOUNTER
Medication Question or Refill    Contacts         Type Contact Phone/Fax    05/29/2024 09:53 AM CDT Phone (Incoming) Mai Montgomery (Self) [phone] (H)            What medication are you calling about (include dose and sig)?: cyclobenzaprine (FLEXERIL) 10 MG tablet     Preferred Pharmacy:    [address] [address]  Phone: [phone] Fax: [phone]      Controlled Substance Agreement on file:   CSA -- Patient Level:    CSA: None found at the patient level.       Who prescribed the medication?:     Do you need a refill? Yes    When did you use the medication last? occasionally    Patient offered an appointment? No, seen in April by HI    Do you have any questions or concerns?  No      Okay to leave a detailed message?: Yes at Cell number on file:    Telephone Information:   Mobile [phone]

## 2024-06-27 ENCOUNTER — TRANSFERRED RECORDS (OUTPATIENT)
Dept: HEALTH INFORMATION MANAGEMENT | Facility: CLINIC | Age: 34
End: 2024-06-27
Payer: COMMERCIAL

## 2024-06-27 LAB
ALT SERPL-CCNC: 5 IU/L (ref 0–32)
AST SERPL-CCNC: 11 IU/L (ref 0–40)
CREATININE (EXTERNAL): 0.7 MG/DL (ref 0.57–1)
GFR ESTIMATED (EXTERNAL): 116 ML/MIN/1.73
GLUCOSE (EXTERNAL): 89 MG/DL (ref 70–99)
POTASSIUM (EXTERNAL): 3.9 MMOL/L (ref 3.5–5.2)

## 2024-11-08 ENCOUNTER — OFFICE VISIT (OUTPATIENT)
Dept: FAMILY MEDICINE | Facility: CLINIC | Age: 34
End: 2024-11-08
Payer: COMMERCIAL

## 2024-11-08 VITALS
SYSTOLIC BLOOD PRESSURE: 126 MMHG | DIASTOLIC BLOOD PRESSURE: 84 MMHG | WEIGHT: 136 LBS | RESPIRATION RATE: 16 BRPM | HEIGHT: 67 IN | OXYGEN SATURATION: 100 % | HEART RATE: 102 BPM | TEMPERATURE: 98.6 F | BODY MASS INDEX: 21.35 KG/M2

## 2024-11-08 DIAGNOSIS — K51.30 ULCERATIVE RECTOSIGMOIDITIS WITHOUT COMPLICATION (H): ICD-10-CM

## 2024-11-08 DIAGNOSIS — M26.622 TMJ TENDERNESS, LEFT: Primary | ICD-10-CM

## 2024-11-08 PROCEDURE — 99213 OFFICE O/P EST LOW 20 MIN: CPT | Performed by: FAMILY MEDICINE

## 2024-11-08 RX ORDER — CYCLOBENZAPRINE HCL 10 MG
5-10 TABLET ORAL
Qty: 45 TABLET | Refills: 0 | Status: SHIPPED | OUTPATIENT
Start: 2024-11-08

## 2024-11-08 ASSESSMENT — PAIN SCALES - GENERAL: PAINLEVEL_OUTOF10: EXTREME PAIN (8)

## 2024-11-08 ASSESSMENT — PATIENT HEALTH QUESTIONNAIRE - PHQ9
10. IF YOU CHECKED OFF ANY PROBLEMS, HOW DIFFICULT HAVE THESE PROBLEMS MADE IT FOR YOU TO DO YOUR WORK, TAKE CARE OF THINGS AT HOME, OR GET ALONG WITH OTHER PEOPLE: NOT DIFFICULT AT ALL
SUM OF ALL RESPONSES TO PHQ QUESTIONS 1-9: 2
SUM OF ALL RESPONSES TO PHQ QUESTIONS 1-9: 2

## 2024-11-08 NOTE — PROGRESS NOTES
Assessment & Plan   1. 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 There is no crepitus, but has limited range of motion. No signs of infection or systemic involvement. Given her history of ulcerative colitis, NSAIDs are not recommended, so alternative pain management options were offered including acetaminophen and cyclobenzaprine. To reduce strain on the jaw, the patient should follow a soft diet and avoid activities that involve excessive jaw movement, such as chewing gum or wide yawning. A referral to ENT for for further evaluation and treatment was given. Physical therapy focusing on TMJ-specific exercises to improve jaw strength and range of motion, as well as manual therapy for muscle tension, is recommended and a referral was given. Follow-up in 4-6 weeks is advised to reassess symptoms, with consideration for imaging, such as CT/MRI, if symptoms persist or worsen, or if joint locking becomes more pronounced. This will help rule out degenerative joint disease or other causes. Given age, I do not feel this represents giant cell arteritis.  - REVIEW OF HEALTH MAINTENANCE PROTOCOL ORDERS  - Adult ENT  Referral; Future  - cyclobenzaprine (FLEXERIL) 10 MG tablet; Take 0.5-1 tablets (5-10 mg) by mouth nightly as needed for muscle spasms.  Dispense: 45 tablet; Refill: 0  - Physical Therapy  Referral; Future    2. Ulcerative rectosigmoiditis without complication (H)  Avoid NSAIDs.        Homero Celestin MD  [facility]    Disclaimer: This note consists of symbols derived from keyboarding, dictation and/or voice recognition software. As a result, there may be errors in the script that have gone undetected. Please consider this when interpreting information found in this chart.    Subjective  "  Mai is a 34 year old, presenting for the following health issues:  Jaw Pain        11/8/2024     1:44 PM   Additional Questions   Roomed by VE     History of Present Illness       Reason for visit:  Jaw pain  Symptom onset:  More than a month  Symptoms include:  Jaw pain  Symptom intensity:  Severe  Symptom progression:  Worsening  Had these symptoms before:  Yes  Has tried/received treatment for these symptoms:  No  What makes it worse:  No  What makes it better:  No   She is taking medications regularly.         Pain History:  When did you first notice your pain? 2-3 months   Have you seen anyone else for your pain? No  How has your pain affected your ability to work? 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 Musculoskeletal problem/pain  Onset/Duration: 2-3 months  Description  Location: jaw - left  Joint Swelling: No  Redness: No  Pain: YES  Warmth: No  Intensity:  8/10  Progression of Symptoms:  worsening  Accompanying signs and symptoms:   Fevers: No  Numbness/tingling/weakness: No  History  Trauma to the area: No  Recent illness:  No  Previous similar problem: YES  Previous evaluation:  No  Precipitating or alleviating factors:  Aggravating factors include: unable to open jaw completely, chewing  Therapies tried and outcome: none    Declines TDAP    Review of Systems  Constitutional, neuro, ENT, endocrine, pulmonary, cardiac, gastrointestinal, genitourinary, musculoskeletal, integument and psychiatric systems are negative, except as otherwise noted.      Objective    /84 (BP Location: Left arm, Patient Position: Chair, Cuff Size: Adult Regular)   Pulse 102   Temp 98.6  F (37  C) (Temporal)   Resp 16   Ht 1.689 m (5' 6.5\")   Wt 61.7 kg (136 lb)   LMP 10/16/2024 (Exact Date)   SpO2 100%   Breastfeeding No   BMI 21.62 kg/m    Body mass index is 21.62 kg/m .  Physical Exam  Vitals reviewed.   HENT:      Head: " Normocephalic and atraumatic.      Jaw: Pain on movement present. No swelling.      Salivary Glands: Right salivary gland is not diffusely enlarged.        Comments: Subjective tenderness left TMJ. No crepitus. Reduced range of motion.     Right Ear: Tympanic membrane, ear canal and external ear normal. No middle ear effusion. There is no impacted cerumen. Tympanic membrane is not injected, scarred, perforated or erythematous.      Left Ear: Tympanic membrane, ear canal and external ear normal.  No middle ear effusion. There is no impacted cerumen. Tympanic membrane is not injected, scarred, perforated or erythematous.      Nose: No congestion or rhinorrhea.      Right Nostril: No epistaxis.      Left Nostril: No epistaxis.      Mouth/Throat:      Lips: Pink.      Mouth: Mucous membranes are moist. No oral lesions.      Tongue: No lesions.      Palate: No mass and lesions.      Pharynx: Oropharynx is clear. No pharyngeal swelling, oropharyngeal exudate or posterior oropharyngeal erythema.   Eyes:      General:         Right eye: No discharge.         Left eye: No discharge.      Extraocular Movements: Extraocular movements intact.      Conjunctiva/sclera: Conjunctivae normal.      Pupils: Pupils are equal, round, and reactive to light.   Cardiovascular:      Rate and Rhythm: Normal rate and regular rhythm.      Heart sounds: Normal heart sounds. No murmur heard.     No friction rub.   Pulmonary:      Effort: Pulmonary effort is normal. No respiratory distress.      Breath sounds: Normal breath sounds. No wheezing or rhonchi.   Musculoskeletal:         General: No swelling.      Cervical back: Normal range of motion and neck supple. No tenderness.   Lymphadenopathy:      Cervical: No cervical adenopathy.   Skin:     General: Skin is warm.      Findings: No rash.   Neurological:      General: No focal deficit present.      Mental Status: She is alert.      Motor: No weakness.      Coordination: Coordination normal.       Gait: Gait normal.   Psychiatric:         Mood and Affect: Mood normal.         Behavior: Behavior normal.         Thought Content: Thought content normal.         Judgment: Judgment normal.            Labs: None        Signed Electronically by: Homero Celestin MD

## 2025-06-30 ENCOUNTER — E-VISIT (OUTPATIENT)
Dept: URGENT CARE | Facility: CLINIC | Age: 35
End: 2025-06-30
Payer: COMMERCIAL

## 2025-06-30 DIAGNOSIS — H10.33 ACUTE BACTERIAL CONJUNCTIVITIS OF BOTH EYES: Primary | ICD-10-CM

## 2025-06-30 RX ORDER — OFLOXACIN 3 MG/ML
SOLUTION/ DROPS OPHTHALMIC
Qty: 5 ML | Refills: 0 | Status: SHIPPED | OUTPATIENT
Start: 2025-06-30 | End: 2025-07-07

## 2025-06-30 NOTE — PATIENT INSTRUCTIONS
Thank you for choosing us for your care. I have placed an order for a prescription so that you can start treatment. View your full visit summary for details by clicking on the link below. Your pharmacist will able to address any questions you may have about the medication.     If you re not feeling better within 2-3 days, please schedule an appointment.  You can schedule an appointment right here in [facility], or call [phone]  If the visit is for the same symptoms as your eVisit, we ll refund the cost of your eVisit if seen within seven days.    Let's try some rx antibiotic eye drops. If no improvement then please follow up in person for further evalution.

## 2025-07-12 ENCOUNTER — HEALTH MAINTENANCE LETTER (OUTPATIENT)
Age: 35
End: 2025-07-12